# Patient Record
Sex: FEMALE
[De-identification: names, ages, dates, MRNs, and addresses within clinical notes are randomized per-mention and may not be internally consistent; named-entity substitution may affect disease eponyms.]

---

## 2023-07-03 ENCOUNTER — NON-APPOINTMENT (OUTPATIENT)
Age: 53
End: 2023-07-03

## 2023-07-05 ENCOUNTER — APPOINTMENT (OUTPATIENT)
Dept: ORTHOPEDIC SURGERY | Facility: CLINIC | Age: 53
End: 2023-07-05
Payer: COMMERCIAL

## 2023-07-05 VITALS — HEIGHT: 65 IN | WEIGHT: 115 LBS | BODY MASS INDEX: 19.16 KG/M2

## 2023-07-05 DIAGNOSIS — S43.101A UNSPECIFIED DISLOCATION OF RIGHT ACROMIOCLAVICULAR JOINT, INITIAL ENCOUNTER: ICD-10-CM

## 2023-07-05 PROBLEM — Z00.00 ENCOUNTER FOR PREVENTIVE HEALTH EXAMINATION: Status: ACTIVE | Noted: 2023-07-05

## 2023-07-05 PROCEDURE — 99203 OFFICE O/P NEW LOW 30 MIN: CPT

## 2023-07-05 NOTE — PHYSICAL EXAM
[de-identified] : PHYSICAL EXAM  RIGHT  SHOULDER\par \par NECK EXAM \par FROM\par NONTENDER \par SPURLING  RIGHT=NEG, LEFT=NEG\par \par MILD  SCAPULAR PROTRACTION\par AROM 140 / 140 / 90 / 30\par TENDER: AC JOINT\par \par SPECIAL TESTING :\par FOY - POSITIVE \par CATALINA - POSITIVE \par SPEED TEST - POSITIVE\par \par TAPIA - NEGATIVE \par APPREHENSION AND SUPPRESSION - NEGATIVE \par \par RC STRENGTH TESTING \par SS:  5/5\par SUB 5/5\par IS     5/5\par BICEPS  5/5\par \par SENSATION  - GROSSLY INTACT\par \par \par   [de-identified] : RIGHT SHOULDER XRAY (2 VIEWS - AP AND OUTLET) -  \par NO OBVIOUS FRACTURE ,  SEPARATION OR DISLOCATION \par NO SIGNIFICANT OSTEOARTHRITIS,\par TYPE 2B ACROMION \par CSA= 40.2\par

## 2023-07-05 NOTE — DISCUSSION/SUMMARY
[de-identified] : COLD PACKS 3-4 X DAY - 2 WEEKS\par NO GYM SPORTS 2 WEEKS\par THEN START ROM EXERCISES \par \par NO FU NEEDED

## 2024-07-21 NOTE — HISTORY OF PRESENT ILLNESS
Infectious Diseases Daily Progress Note      Patient's Name: Rosalva Sanderson   Date of Service: 7/21/2024     Date of admission: 7/12/2024                Hospital Day: 10                             Rosalva Sanderson who is a 76 year old female admitted 7/12/2024  3:12 PM                  Scheduled Medications   potassium CHLORIDE, 40 mEq, Once  dexAMETHasone, 10 mg, Daily  fluticasone-umeclidin-vilanterol, 1 puff, Daily Resp  acetaminophen, 500 mg, 4x Daily  lidocaine, 1 patch, Daily  acyclovir, 400 mg, 2 times per day  enoxaparin, 40 mg, Q12H  ipratropium-albuterol, 3 mL, 4x Daily Resp  anastrozole, 1 mg, Daily  aspirin, 81 mg, Daily  [Held by provider] enalapril-hydroCHLOROthiazide 10-25 mg for VASERETIC, , Daily  metoPROLOL succinate, 25 mg, Daily  pantoprazole, 40 mg, QAM AC  rosuvastatin, 20 mg, Nightly  sertraline, 50 mg, Daily  sodium chloride, 2 mL, 2 times per day  Potassium Standard Replacement Protocol (Levels 3.5 and lower), , See Admin Instructions  Potassium Replacement (Levels 3.6 - 4), , See Admin Instructions  Magnesium Standard Replacement Protocol, , See Admin Instructions  Potassium Standard Replacement Protocol (Levels 3.5 and lower), , See Admin Instructions  Potassium Replacement (Levels 3.6 - 4), , See Admin Instructions  Magnesium Standard Replacement Protocol, , See Admin Instructions  guaiFENesin, 1,200 mg, 2 times per day          Past Medical History, Social History, Medications and Allergies reviewed.   Reviewed Pertinent: Laboratory studies, radiographic studies, medications, and recent progress notes.    Subjective:  Oxygen requirements improving , off Airvo and weaned down to 5 L nasal cannula.  CRP is now normal range.      Objective:    VITAL SIGNS  Temp  Min: 96.7 °F (35.9 °C)  Max: 97.8 °F (36.6 °C)  Temp:  [96.7 °F (35.9 °C)-97.8 °F (36.6 °C)] 97.8 °F (36.6 °C)  Heart Rate:  [] 65  Resp:  [14-39] 24  BP: (104-135)/(57-86) 112/60  FiO2 (%):  [50 %-75 %] 50 %    Physical examination:  General : Awake, no acute distress, appears comfortable on the cannula oxygen  Head:  PERRL, No icterus, no oral thrush, no oral lesions  Neck: supple   Pulm:  Diminished breath sounds, bilateral crackles  GI: Abdomen is soft , non tender,     bowel sounds are normal.  : No tang Catheter. No CVA or suprapubic tenderness.    CVS:  Pulse regular, S1, S2 normal, no m/g/r   Extremities: No cyanosis, edema or clubbing in lower extremities  Skin: No rashes  MSK: No major joint swelling , pain, erythema, effusion.       Laboratory data, microbiology, imaging:    Recent Labs   Lab 07/21/24  0420 07/20/24  0345 07/19/24  0401 07/18/24  0415 07/18/24  0414 07/17/24  0432 07/17/24  0431 07/16/24  0405 07/15/24  0606   WBC  --  10.9  --  12.9*  --   --   --   --  9.7   ANEUT  --   --   --  10.4*  --   --   --   --  7.9*   BANDS  --  1  --   --   --   --   --   --   --    HGB  --  10.9*  --  11.1*  --   --   --   --  10.6*   HCT  --  31.5*  --  33.2*  --   --   --   --  31.7*   PLT  --  279  --  277  --   --   --   --  217   CPK  --   --   --   --   --  80  --   --  205*   FERR 243  --  255*  --   --  239  --   --  235   DDIMER 1.01*  --  1.19*  --   --   --  0.86*  --  1.34*   LDH  --   --   --   --   --  702*  --   --  551*   CRP 5.3  --   --   --  28.4*  --   --  111.0* 204.0*     Recent Labs   Lab 07/21/24 0420 07/20/24 0345 07/19/24 0401 07/18/24 0414 07/17/24  0432 07/16/24  0405 07/15/24  0606   SODIUM  --  130*  --  129*  --   --  130*   POTASSIUM 4.0 4.0 4.3 4.0  --   --  4.2   BUN  --  18  --  23*  --   --  24*   CREATININE  --  0.60  --  0.76  --   --  0.67   GLUCOSE  --  154*  --  106*  --   --  97   CALCIUM  --  8.6  --  8.9  --   --  9.0   ALBUMIN  --   --   --  3.2*  --   --  3.0*   AST  --   --   --  30  --   --  50*   GPT  --   --   --  40  --   --  33   ALKPT  --   --   --  71  --   --  56   BILIRUBIN  --   --   --  1.2*  --   --  0.9   CPK  --   --   --   --  80  --  205*   CRP  [de-identified] : LOCATION:RIGHT SHOULDER INJURY- [facility] \par DURATION:JULY 1, 2023- PATIENT FAINTED AND FELL ON HER RIGHT SHOULDER \par QUALITY:SHARP \par RADIATING PAIN UP NECK\par INTERMITTENT \par PAIN LEVEL: 7-8/10 \par BETTER WITH ICE, RESTING \par WORSE WITH PUSHING, REACHING BEHIND \par \par \par  5.3  --   --  28.4*  --  111.0* 204.0*       Recent Labs     07/14/24  0819 07/15/24  0606 07/16/24  0405 07/17/24  0431 07/17/24  0432 07/18/24  0414 07/19/24  0401 07/21/24  0420   .0*  116.0* 204.0* 111.0*  --   --  28.4*  --  5.3   FERR 225 235  --   --  239  --  255* 243   DDIMER 1.74* 1.34*  --  0.86*  --   --  1.19* 1.01*   * 551*  --   --  702*  --   --   --    CPK  --  205*  --   --  80  --   --   --        Recent Labs   Lab 07/18/24  0414 07/15/24  0606   AST 30 50*   GPT 40 33   ALKPT 71 56   BILIRUBIN 1.2* 0.9     No results found    Recent Labs   Lab 07/17/24  0608         Pathogen panel is positive for COVID-19 on 07/13/2024      Radiology/Imaging:   XR CHEST AP OR PA   Final Result by Gabe Vigil MD (07/20 1140)   IMPRESSION: Persistent extensive bilateral pulmonary infiltrates, left   greater than right with persistent right paratracheal and perihilar soft   tissue prominence.         Electronically Signed by: Gabe Vigil MD   Signed on: 7/20/2024 11:40 AM   Created on Workstation ID: [id number]   Signed on Workstation ID: [id number]      XR CHEST AP OR PA   Final Result by Collin Smith MD (07/19 5956)   IMPRESSION:   1. Redemonstration of multifocal interstitial and streaky opacities   particularly in the perihilar regions and left mid to lower lung. This is   slightly increased compared to most recent exam. Recommend continued   follow-up.      Electronically Signed by: Collin Smith MD   Signed on: 7/19/2024 5:11 AM   Created on Workstation ID: [id number]   Signed on Workstation ID: [id number]      XR CHEST AP OR PA   Final Result by Stone Cha MD (07/16 0087)   IMPRESSION:      Slight improvement in the pulmonary aeration with persistent bilateral   patchy and interstitial opacities. Patient may have improving pneumonia   superimposed on chronic pulmonary fibrosis.            I, Attending Radiologist Stnoe Cha MD, have reviewed the images and   report and  concur with these findings interpreted by Resident Radiologist,   Romeo Akbar MD.      Electronically Signed by: Stone Cha MD   Signed on: 7/16/2024 3:25 PM   Created on Workstation ID: [id number]   Signed on Workstation ID: [id number]      XR CHEST AP OR PA   Final Result by Zach Coffman MD (07/14 0705)   IMPRESSION:        Stable chest radiograph.      Electronically Signed by: Zach Coffman MD   Signed on: 7/14/2024 7:05 AM   Created on Workstation ID: [id number]   Signed on Workstation ID: [id number]      XR CHEST AP OR PA   Final Result by Zhou Munroe MD (07/13 0604)   IMPRESSION:   1. Interval increase/worsening in diffuse bilateral pulmonary infiltrates.   Differential concerns are broad and may include pulmonary edema/congestive   failure, aspiration, or even an infectious process in the appropriate   clinical setting.      Electronically Signed by: Zhou Munroe MD   Signed on: 7/13/2024 6:04 AM   Created on Workstation ID: [id number]   Signed on Workstation ID: [id number]      XR CHEST AP OR PA   Final Result by Tanvir Burger MD (07/12 1737)   IMPRESSION:      Patchy interstitial prominence identified in the lungs bilaterally. The   findings are nonspecific, however, can be seen with a viral etiology or   small vessel reactive airway disease.               Electronically Signed by: Tanvir Burger MD   Signed on: 7/12/2024 5:37 PM   Created on Workstation ID: [id number]   Signed on Workstation ID: [id number]              2D echo 7/13/24  Final Impressions    * Normal left ventricular systolic function with ejection fraction of 60 %.    * Mildly elevated right ventricular systolic pressure 49 mmHg.    * Normal inferior vena cava with >50% collapse upon inspiration consistent  with normal right atrial pressure, 3 mmHg.      Diagnosis:  (U07.1) COVID-19 virus detected  (R09.02) Hypoxia  (E87.1) Hyponatremia    Assessment:     Severe acute hypoxic respiratory failure   COVID-19 pneumonia.  Vaccinated  patient   Hyponatremia   Chronic hypoxic respiratory failure on home oxygen    Plan & Recommendations:     Clinically improving.  Day 10 of corticosteroids.  Currently on 10 mg of dexamethasone.  Monitor inflammatory markers .  CRP is trending down and now in normal range.   Received tocilizumab 7/14/24 . Continue acyclovir for prophylaxis for total of 28 days  Completed 5 day course of remdesivir.  Last dose given on 07/16/2024  Out of bed when able  Continue enhanced isolation precautions Total of 10 days, until 07/23/2024       MChelsea Shahid MD  Infectious Diseases  [phone] (P)   [phone] (O)  7/21/2024     11:15 AM